# Patient Record
Sex: FEMALE | Race: WHITE | NOT HISPANIC OR LATINO | Employment: FULL TIME | URBAN - METROPOLITAN AREA
[De-identification: names, ages, dates, MRNs, and addresses within clinical notes are randomized per-mention and may not be internally consistent; named-entity substitution may affect disease eponyms.]

---

## 2019-12-27 ENCOUNTER — HOSPITAL ENCOUNTER (EMERGENCY)
Facility: CLINIC | Age: 24
Discharge: HOME | End: 2019-12-27
Attending: EMERGENCY MEDICINE | Admitting: EMERGENCY MEDICINE
Payer: COMMERCIAL

## 2019-12-27 VITALS
TEMPERATURE: 98.3 F | DIASTOLIC BLOOD PRESSURE: 69 MMHG | WEIGHT: 136 LBS | RESPIRATION RATE: 16 BRPM | OXYGEN SATURATION: 98 % | BODY MASS INDEX: 20.61 KG/M2 | SYSTOLIC BLOOD PRESSURE: 107 MMHG | HEIGHT: 68 IN | HEART RATE: 74 BPM

## 2019-12-27 DIAGNOSIS — Z91.018 FOOD ALLERGY: ICD-10-CM

## 2019-12-27 PROCEDURE — 25000131 ZZH RX MED GY IP 250 OP 636 PS 637: Performed by: EMERGENCY MEDICINE

## 2019-12-27 PROCEDURE — 25000128 H RX IP 250 OP 636: Performed by: EMERGENCY MEDICINE

## 2019-12-27 PROCEDURE — 25000132 ZZH RX MED GY IP 250 OP 250 PS 637: Performed by: EMERGENCY MEDICINE

## 2019-12-27 PROCEDURE — 99283 EMERGENCY DEPT VISIT LOW MDM: CPT

## 2019-12-27 RX ORDER — EPINEPHRINE 0.3 MG/.3ML
0.3 INJECTION SUBCUTANEOUS
Qty: 1 EACH | Refills: 0 | Status: SHIPPED | OUTPATIENT
Start: 2019-12-27

## 2019-12-27 RX ORDER — FAMOTIDINE 20 MG/1
40 TABLET, FILM COATED ORAL ONCE
Status: COMPLETED | OUTPATIENT
Start: 2019-12-27 | End: 2019-12-27

## 2019-12-27 RX ORDER — DEXAMETHASONE 4 MG/1
12 TABLET ORAL ONCE
Status: COMPLETED | OUTPATIENT
Start: 2019-12-27 | End: 2019-12-27

## 2019-12-27 RX ORDER — ONDANSETRON 4 MG/1
4 TABLET, ORALLY DISINTEGRATING ORAL ONCE
Status: COMPLETED | OUTPATIENT
Start: 2019-12-27 | End: 2019-12-27

## 2019-12-27 RX ADMIN — DEXAMETHASONE 12 MG: 4 TABLET ORAL at 21:26

## 2019-12-27 RX ADMIN — ONDANSETRON 4 MG: 4 TABLET, ORALLY DISINTEGRATING ORAL at 21:31

## 2019-12-27 RX ADMIN — FAMOTIDINE 40 MG: 20 TABLET, FILM COATED ORAL at 21:27

## 2019-12-27 ASSESSMENT — MIFFLIN-ST. JEOR: SCORE: 1420.39

## 2019-12-27 ASSESSMENT — ENCOUNTER SYMPTOMS
FACIAL SWELLING: 1
NAUSEA: 1

## 2019-12-27 NOTE — ED AVS SNAPSHOT
Emergency Department  [address] [place] [id number]  Phone:  [phone]  Fax:  [phone]                                    Deborah Borden   MRN: [id number]    Department:   Emergency Department   Date of Visit:  12/27/2019           After Visit Summary Signature Page    I have received my discharge instructions, and my questions have been answered. I have discussed any challenges I see with this plan with the nurse or doctor.    ..........................................................................................................................................  Patient/Patient Representative Signature      ..........................................................................................................................................  Patient Representative Print Name and Relationship to Patient    ..................................................               ................................................  Date                                   Time    ..........................................................................................................................................  Reviewed by Signature/Title    ...................................................              ..............................................  Date                                               Time          22EPIC Rev 08/18

## 2019-12-28 NOTE — ED TRIAGE NOTES
Patient here with an allergic reaction to sunflower seeds.she c/o hives ,bilateral ears closing.denies throat tightness

## 2019-12-28 NOTE — ED PROVIDER NOTES
"  History     Chief Complaint:  Allergic Reaction       HPI   Deborah Borden is a 24 year old female who presents with allergic reaction. The patient states that she is allergic to sunflower seeds and accidentally ingested sunflower seeds at 1830 today. The patient states that her ears started swelling and closing up. She took 2 tablets of benadryl at 1835. The patient reports having associated nausea and shaking of her extremities. The patient also had facial swelling, but the facial swelling has since resolved. She reports having a rash and itching around her neck. She denies any hospitalizations in the past from allergic reactions. The patient was concerned about her throat swelling and decided to come into the emergency room for evaluation.     Allergies:  Sunflower seeds   Dogs   Mustard     Medications:    Ortho-cept     Past Medical History:    History reviewed. No pertinent past medical history.    Past Surgical History:    History reviewed. No pertinent surgical history.     Family History:    History reviewed. No pertinent family history.     Social History:  The patient was accompanied to the ED by father.  Smoking Status: Never Smoker  Smokeless Tobacco: Never Used    Review of Systems   HENT: Positive for facial swelling.         Bilateral ear swelling   Gastrointestinal: Positive for nausea.   Skin: Positive for rash (neck ).   All other systems reviewed and are negative.      Physical Exam     Patient Vitals for the past 24 hrs:   BP Temp Temp src Heart Rate Resp SpO2 Height Weight   12/27/19 2041 129/82 98.3  F (36.8  C) Oral 81 16 100 % 1.727 m (5' 8\") 61.7 kg (136 lb)        Physical Exam    Eye:  Pupils are equal, round, and reactive.  Extraocular movements intact.    ENT:  No rhinorrhea.  Moist mucus membranes.  Normal tongue and tonsil.    Cardiac:  Regular rate and rhythm.  No murmurs, gallops, or rubs.    Pulmonary:  Clear to auscultation bilaterally.  No wheezes, rales, or " rhonchi.    Abdomen:  Positive bowel sounds.  Abdomen is soft and non-distended, without focal tenderness.    Musculoskeletal:  Normal movement of all extremities without evidence for deficit.    Skin:  Warm and dry. Small scattered urticaria over the upper chest.     Neurologic:  Non-focal exam without asymmetric weakness or numbness.     Psychiatric:  Normal affect with appropriate interaction with examiner.     Emergency Department Course     Interventions:  2126 Decadron 12 mg oral   2127 pepcid 40 mg oral   2131 zofran 4 mg oral     Emergency Department Course:  Past medical records, nursing notes, and vitals reviewed.    2045 I performed an exam of the patient as documented above.       2155 Patient rechecked and updated.       Findings and plan explained to the Patient. Patient discharged home with instructions regarding supportive care, medications, and reasons to return. The importance of close follow-up was reviewed. The patient was prescribed epipen.         Impression & Plan     Medical Decision Making:  Deborah Borden is a 24 year old female who presents to the emergency department today with concerns for an allergic reaction.  The patient has a known food allergy to sunflower seeds.  These were baked into bread that she [place] while at the airport.  She knew immediately, starting to experience typical symptoms of itching, fullness in her ears, and nausea with abdominal cramping.  She immediately took 2 Benadryl.  She attempted to get on her flight, but as her symptoms continued to intensify, she became concerned that she would have issues while on the flight and instead elected to come to the emergency department.    On my assessment, the patient shows a few hives on her neck.  Otherwise, her exam is unremarkable.  She shows no evidence of anaphylaxis.  I do not believe she requires epinephrine.  She was given a steroid along with famotidine.  She was observed over period of an hour during which time  she had complete total resolution of all symptoms.  At this juncture, I feel she is safe for discharge home.  She was advised to fill the prescription for an EpiPen to have in case something like this happens again.  We discussed the low likelihood of rebound anaphylaxis with a food allergy, though she knows to return to us if she develops any return of her symptoms or other emergent concerns.      Discharge Diagnosis:    ICD-10-CM    1. Food allergy Z91.018        Disposition:  The patient is discharged to home.    Discharge Medications:  New Prescriptions    EPINEPHRINE (EPIPEN/ADRENACLICK/OR ANY BX GENERIC EQUIV) 0.3 MG/0.3ML INJECTION 2-PACK    Inject 0.3 mLs (0.3 mg) into the muscle once as needed for anaphylaxis       Scribe Disclosure:  I, Lazaro Pereira, am serving as a scribe at 8:45 PM on 12/27/2019 to document services personally performed by Trierweiler, Chad A, MD based on my observations and the provider's statements to me.      12/27/2019   Trierweiler, Chad A, MD Trierweiler, Chad A, MD  12/27/19 6134

## 2023-02-21 NOTE — PROGRESS NOTES
SUBJECTIVE   Deborah Borden is a 27 year old G0 who presents for consultation for irregular bleeding.    She had Nexplanon placed about 2 years ago. Prior to that had irregular periods, usually monthly but diffficult to predict the day. She also had significant cramps with these. She was briefly on OCPs however switched to Nexplanon 2 years ago. Since that time she usually only has a period about every 4 months. She had a bleeding episode in , then not until January. On the  she had 1 week heavy bleeding changing tampons 3-4x/day with some blood clots. Then had bleeding again on  that was heavy, and lighter bleeding on  that has since stopped. She was having some nausea with bleeding, and migraines without aura that are unusual for her.     She also notes some vaginal discharge that is watery and increased in amount recently. Denies dysuria or constipation.       Gynecologic History  LMP: Patient's last menstrual period was 2023 (exact date).  Menses: See above  Contraception: Nexplanon inserted 2021  Prior hormonal treatment: OCPs, Nexplanon  Sexually active: Yes with male partners only, currently with one partner her boyfriend  Hx STI: Denies  Last pap smear: 3 years ago, normal result  Completed HPV vaccine    Obstetric History  OB History    Para Term  AB Living   0 0 0 0 0 0   SAB IAB Ectopic Multiple Live Births   0 0 0 0 0        Past Medical History  Past Medical History:   Diagnosis Date     Migraine without aura and without status migrainosus, not intractable        Past Surgical History  History reviewed. No pertinent surgical history.    Medications  Current Outpatient Medications   Medication     etonogestrel (NEXPLANON) 68 MG IMPL     medroxyPROGESTERone (PROVERA) 10 MG tablet     metroNIDAZOLE (METROGEL) 0.75 % vaginal gel     EPINEPHrine (EPIPEN/ADRENACLICK/OR ANY BX GENERIC EQUIV) 0.3 MG/0.3ML injection 2-pack     No current facility-administered medications  "for this visit.       Allergies     Allergies   Allergen Reactions     Sesame Oil Anaphylaxis       Social History Works as  for [employer] in [employer]  Social History     Tobacco Use     Smoking status: Never     Smokeless tobacco: Never   Vaping Use     Vaping Use: Never used   Substance Use Topics     Alcohol use: Yes     Comment: 5 a months     Drug use: Never       Family History  Family History   Problem Relation Age of Onset     Breast Cancer Paternal Aunt 55     Breast Cancer Paternal Aunt      Diabetes No family hx of      Hyperlipidemia No family hx of      Genetic Disorder No family hx of      Depression No family hx of      Coronary Artery Disease No family hx of        Review of Systems  Negative unless specified above    OBJECTIVE   /75   Pulse 78   Ht 1.73 m (5' 8.11\")   Wt 58.3 kg (128 lb 9.6 oz)   LMP 2023 (Exact Date)   BMI 19.49 kg/m    BMI: Body mass index is 19.49 kg/m .  General:  Alert, no distress   Breast:  Deferred    Lungs:  Normal work of breathing   Heart:  Regular rate   Abdomen:  Soft, non-distended   Pelvic: Normal external genitalia, normal vaginal mucosa and cervix. No significant vaginal discharge. Pap smear and STI swabs and wet prep obtained.    Extremities:  Normal     Labs:  Wet prep: pH 3.6, + WBC, no clue cells    Imaging:   None    ASSESSMENT   Deborah Borden is a 27 year old  here today for consultation for abnormal uterine bleeding. Ddx likely secondary to Nexplanon and overgrowth of endometrial lining without bleeding episode since , now s/p heavy bleeding. Unlikely structural etiology given age or secondary AUB given current Nexplanon in place.     Given bleeding is now resolving, will continue expectant management. If persistent, consider withdrawal bleed given possible anovulatory bleeding on Nexplanon with 7d Provera. RTC in 3 months to evaluate bleeding. Discussed this could be persistent side effect from Nexplanon, if so can " consider addition of hormonal medication for regulation of bleeding or removal and alternative birth control options. Can also consider additional AUB work up at that time with US, CBC, TSH, prolactin as needed.       PLAN     #AUB  - Likely secondary to Nexplanon  - Provera for withdrawal bleed sent to pharmacy, to be started if she has additional irregular bleeding   - RTC 2-3 months, if continued irregular bleeding discuss additional/alternative options to Nexplanon and additional AUB work up    #Vaginal discharge   - Symptomatic vaginal discharge despite negative exam, wet prep  - If not resolving in next 1-2 days, will treat empirically for BV  - Metrogel sent to pharmacy     #Pap screening  - Last in 2020 normal  - Pap done today    #STI screening  - GCCT swab today    Results via MyChart  RTC 2-3 months to follow up bleeding  Staffed with Dr. Slade Peck MD PGY4  Obstetrics & Gynecology  02/21/23     The patient was seen in resident continuity clinic by Dr. Peck.  I have reviewed the history and exam, the assessment and plan were jointly made.     Ava June MD, FACOG

## 2023-02-23 ENCOUNTER — OFFICE VISIT (OUTPATIENT)
Dept: OBGYN | Facility: CLINIC | Age: 28
End: 2023-02-23
Attending: STUDENT IN AN ORGANIZED HEALTH CARE EDUCATION/TRAINING PROGRAM
Payer: COMMERCIAL

## 2023-02-23 VITALS
HEART RATE: 78 BPM | DIASTOLIC BLOOD PRESSURE: 75 MMHG | WEIGHT: 128.6 LBS | HEIGHT: 68 IN | BODY MASS INDEX: 19.49 KG/M2 | SYSTOLIC BLOOD PRESSURE: 112 MMHG

## 2023-02-23 DIAGNOSIS — Z12.4 SCREENING FOR CERVICAL CANCER: ICD-10-CM

## 2023-02-23 DIAGNOSIS — N92.1 BREAKTHROUGH BLEEDING ON NEXPLANON: Primary | ICD-10-CM

## 2023-02-23 DIAGNOSIS — N76.0 BACTERIAL VAGINOSIS: ICD-10-CM

## 2023-02-23 DIAGNOSIS — B96.89 BACTERIAL VAGINOSIS: ICD-10-CM

## 2023-02-23 DIAGNOSIS — Z11.3 SCREEN FOR STD (SEXUALLY TRANSMITTED DISEASE): ICD-10-CM

## 2023-02-23 DIAGNOSIS — Z97.5 BREAKTHROUGH BLEEDING ON NEXPLANON: Primary | ICD-10-CM

## 2023-02-23 LAB
CLUE CELLS: NEGATIVE
TRICHOMONAS (WET PREP): NEGATIVE
WBC (WET PREP): NORMAL
YEAST (WET PREP): NEGATIVE

## 2023-02-23 PROCEDURE — 87210 SMEAR WET MOUNT SALINE/INK: CPT | Performed by: STUDENT IN AN ORGANIZED HEALTH CARE EDUCATION/TRAINING PROGRAM

## 2023-02-23 PROCEDURE — 99203 OFFICE O/P NEW LOW 30 MIN: CPT | Mod: GE | Performed by: OBSTETRICS & GYNECOLOGY

## 2023-02-23 PROCEDURE — 87591 N.GONORRHOEAE DNA AMP PROB: CPT | Performed by: STUDENT IN AN ORGANIZED HEALTH CARE EDUCATION/TRAINING PROGRAM

## 2023-02-23 PROCEDURE — G0463 HOSPITAL OUTPT CLINIC VISIT: HCPCS | Performed by: STUDENT IN AN ORGANIZED HEALTH CARE EDUCATION/TRAINING PROGRAM

## 2023-02-23 PROCEDURE — 87491 CHLMYD TRACH DNA AMP PROBE: CPT | Performed by: STUDENT IN AN ORGANIZED HEALTH CARE EDUCATION/TRAINING PROGRAM

## 2023-02-23 PROCEDURE — G0145 SCR C/V CYTO,THINLAYER,RESCR: HCPCS | Performed by: STUDENT IN AN ORGANIZED HEALTH CARE EDUCATION/TRAINING PROGRAM

## 2023-02-23 RX ORDER — METRONIDAZOLE 7.5 MG/G
1 GEL VAGINAL DAILY
Qty: 35 G | Refills: 0 | Status: SHIPPED | OUTPATIENT
Start: 2023-02-23 | End: 2023-03-02

## 2023-02-23 RX ORDER — MEDROXYPROGESTERONE ACETATE 10 MG
10 TABLET ORAL DAILY
Qty: 7 TABLET | Refills: 0 | Status: SHIPPED | OUTPATIENT
Start: 2023-02-23 | End: 2023-03-02

## 2023-02-23 NOTE — NURSING NOTE
Has nexplanon since 2020  Since this jan 21 has had two periods a month that lat one week. And cramping.  Last pap smear 3 years ago  wnl

## 2023-02-23 NOTE — LETTER
Date:February 24, 2023      Provider requested that no letter be sent. Do not send.       [facility]

## 2023-02-23 NOTE — LETTER
2023       RE: Deborah Borden  [address] [id number]     Dear Colleague,    Thank you for referring your patient, Deborah Borden, to the [facility] [place] at [facility]. Please see a copy of my visit note below.    SUBJECTIVE   Deborah Borden is a 27 year old G0 who presents for consultation for irregular bleeding.    She had Nexplanon placed about 2 years ago. Prior to that had irregular periods, usually monthly but diffficult to predict the day. She also had significant cramps with these. She was briefly on OCPs however switched to Nexplanon 2 years ago. Since that time she usually only has a period about every 4 months. She had a bleeding episode in , then not until January. On the  she had 1 week heavy bleeding changing tampons 3-4x/day with some blood clots. Then had bleeding again on  that was heavy, and lighter bleeding on  that has since stopped. She was having some nausea with bleeding, and migraines without aura that are unusual for her.     She also notes some vaginal discharge that is watery and increased in amount recently. Denies dysuria or constipation.       Gynecologic History  LMP: Patient's last menstrual period was 2023 (exact date).  Menses: See above  Contraception: Nexplanon inserted 2021  Prior hormonal treatment: OCPs, Nexplanon  Sexually active: Yes with male partners only, currently with one partner her boyfriend  Hx STI: Denies  Last pap smear: 3 years ago, normal result  Completed HPV vaccine    Obstetric History  OB History    Para Term  AB Living   0 0 0 0 0 0   SAB IAB Ectopic Multiple Live Births   0 0 0 0 0        Past Medical History  Past Medical History:   Diagnosis Date     Migraine without aura and without status migrainosus, not intractable        Past Surgical History  History reviewed. No pertinent surgical history.    Medications  Current  "Outpatient Medications   Medication     etonogestrel (NEXPLANON) 68 MG IMPL     medroxyPROGESTERone (PROVERA) 10 MG tablet     metroNIDAZOLE (METROGEL) 0.75 % vaginal gel     EPINEPHrine (EPIPEN/ADRENACLICK/OR ANY BX GENERIC EQUIV) 0.3 MG/0.3ML injection 2-pack     No current facility-administered medications for this visit.       Allergies     Allergies   Allergen Reactions     Sesame Oil Anaphylaxis       Social History Works as  for [employer] in [employer]  Social History     Tobacco Use     Smoking status: Never     Smokeless tobacco: Never   Vaping Use     Vaping Use: Never used   Substance Use Topics     Alcohol use: Yes     Comment: 5 a months     Drug use: Never       Family History  Family History   Problem Relation Age of Onset     Breast Cancer Paternal Aunt 55     Breast Cancer Paternal Aunt      Diabetes No family hx of      Hyperlipidemia No family hx of      Genetic Disorder No family hx of      Depression No family hx of      Coronary Artery Disease No family hx of        Review of Systems  Negative unless specified above    OBJECTIVE   /75   Pulse 78   Ht 1.73 m (5' 8.11\")   Wt 58.3 kg (128 lb 9.6 oz)   LMP 2023 (Exact Date)   BMI 19.49 kg/m    BMI: Body mass index is 19.49 kg/m .  General:  Alert, no distress   Breast:  Deferred    Lungs:  Normal work of breathing   Heart:  Regular rate   Abdomen:  Soft, non-distended   Pelvic: Normal external genitalia, normal vaginal mucosa and cervix. No significant vaginal discharge. Pap smear and STI swabs and wet prep obtained.    Extremities:  Normal     Labs:  Wet prep: pH 3.6, + WBC, no clue cells    Imaging:   None    ASSESSMENT   Deborah Borden is a 27 year old  here today for consultation for abnormal uterine bleeding. Ddx likely secondary to Nexplanon and overgrowth of endometrial lining without bleeding episode since , now s/p heavy bleeding. Unlikely structural etiology given age or secondary AUB given current " Nexplanon in place.     Given bleeding is now resolving, will continue expectant management. If persistent, consider withdrawal bleed given possible anovulatory bleeding on Nexplanon with 7d Provera. RTC in 3 months to evaluate bleeding. Discussed this could be persistent side effect from Nexplanon, if so can consider addition of hormonal medication for regulation of bleeding or removal and alternative birth control options. Can also consider additional AUB work up at that time with US, CBC, TSH, prolactin as needed.       PLAN     #AUB  - Likely secondary to Nexplanon  - Provera for withdrawal bleed sent to pharmacy, to be started if she has additional irregular bleeding   - RTC 2-3 months, if continued irregular bleeding discuss additional/alternative options to Nexplanon and additional AUB work up    #Vaginal discharge   - Symptomatic vaginal discharge despite negative exam, wet prep  - If not resolving in next 1-2 days, will treat empirically for BV  - Metrogel sent to pharmacy     #Pap screening  - Last in 2020 normal  - Pap done today    #STI screening  - GCCT swab today    Results via MyChart  RTC 2-3 months to follow up bleeding  Staffed with Dr. Slade Peck MD PGY4  Obstetrics & Gynecology  02/21/23     The patient was seen in resident continuity clinic by Dr. Peck.  I have reviewed the history and exam, the assessment and plan were jointly made.     Ava June MD, FACOG            Again, thank you for allowing me to participate in the care of your patient.      Sincerely,    Sheryl Peck MD

## 2023-02-24 ENCOUNTER — LAB (OUTPATIENT)
Dept: LAB | Facility: CLINIC | Age: 28
End: 2023-02-24
Payer: COMMERCIAL

## 2023-02-24 DIAGNOSIS — Z11.3 SCREEN FOR STD (SEXUALLY TRANSMITTED DISEASE): Primary | ICD-10-CM

## 2023-02-24 LAB
C TRACH DNA SPEC QL PROBE+SIG AMP: NEGATIVE
N GONORRHOEA DNA SPEC QL NAA+PROBE: NEGATIVE

## 2023-02-28 LAB
BKR LAB AP GYN ADEQUACY: NORMAL
BKR LAB AP GYN INTERPRETATION: NORMAL
BKR LAB AP HPV REFLEX: NORMAL
BKR LAB AP PREVIOUS ABNORMAL: NORMAL
PATH REPORT.COMMENTS IMP SPEC: NORMAL
PATH REPORT.COMMENTS IMP SPEC: NORMAL
PATH REPORT.RELEVANT HX SPEC: NORMAL

## 2023-04-09 ENCOUNTER — HEALTH MAINTENANCE LETTER (OUTPATIENT)
Age: 28
End: 2023-04-09

## 2023-04-23 NOTE — PROGRESS NOTES
[facility]  Gynecology Visit    HPI:  Deborah Borden is a 28 year old , here for follow up of AUB. Was seen in clinic . Discussed that irregular bleeding is likely secondary to Nexplanon. Unlikely structural etiology given age. Considered withdrawal bleed given possible anovulatory bleeding on Nexplanon with 7d Provera.     Today, reports bleeding has been persistently every other week. Most recent bleed 1.5 weeks ago. Bleeding for 4-5 days, using 2-3 pads/tampons per day. Has not yet tried Provera. Also, very nauseous last week/feeling off with intense mood swings.  Wondering if this could be related to systemic hormones. Finally, having vaginal discharge and pain with intercourse that she thinks is from a yeast infection.     GYN History  LMP: Patient's last menstrual period was 1.5 weeks ago  Menses: See above  Contraception: Nexplanon inserted 2021  Prior hormonal treatment: OCPs, Nexplanon  Sexually active: Yes with male partners only, currently with one partner her boyfriend  Hx STI: Denies  Last pap smear: 2023, NILM  Completed HPV vaccine     OBHx  OB History    Para Term  AB Living   0 0 0 0 0 0   SAB IAB Ectopic Multiple Live Births   0 0 0 0 0     PMHx: Migraine without aura   PSHx: None  Meds: Nexplanon    ROS: 10-Point ROS negative except as noted in HPI    Physical Exam  LMP 2023 (Exact Date)   Gen: Well-appearing, NAD  HEENT: Normocephalic, atraumatic  CV:  Regular rate, well perfused  Pulm: Breathing comfortably on room air     Assessment/Plan:  Deborah Borden is a 28 year old  female here for breakthrough bleeding on Nexplanon. Discussed possible etiologies again including structural causes (PALM) and iatrogenic 2/2 Nexplanon. Management options reviewed including adding OCPs to try to control irregular bleeding or switching from Nexplanon to alternative option such as OCP, patch, ring, depo, Mirena IUD. Recommended IUD due to most favorable  bleeding pattern. Discussed that mood swings could be secondary to systemic progesterone. Another benefit of the IUD therefore would be decreased systemic hormones. She is not interested in OCPs, patch or ring as she had a bad experience previously with estrogen containing birth control.   - Considering Mirena IUD; is going to research and MyChart to schedule placement if this is what she desires   - Fluconazole 150 mg PO x 1 sent to Pharmacy for yeast; discussed that if dyspareunia continues may be due hypoestrogenic effect of Nexplanon- could then consider topical estrogen     Staffed with Dr. Slade Noguera MD  Ob/Gyn PGY-1  04/24/23 11:51 AM    The patient was seen and evaluated with Dr. Noguera.  I have reviewed and edited the above note.     Ava June MD, FACOG

## 2023-04-24 ENCOUNTER — OFFICE VISIT (OUTPATIENT)
Dept: OBGYN | Facility: CLINIC | Age: 28
End: 2023-04-24
Attending: OBSTETRICS & GYNECOLOGY
Payer: COMMERCIAL

## 2023-04-24 VITALS
DIASTOLIC BLOOD PRESSURE: 72 MMHG | WEIGHT: 132.7 LBS | HEIGHT: 68 IN | SYSTOLIC BLOOD PRESSURE: 105 MMHG | BODY MASS INDEX: 20.11 KG/M2 | HEART RATE: 74 BPM

## 2023-04-24 DIAGNOSIS — Z97.5 BREAKTHROUGH BLEEDING ON NEXPLANON: Primary | ICD-10-CM

## 2023-04-24 DIAGNOSIS — N92.1 BREAKTHROUGH BLEEDING ON NEXPLANON: Primary | ICD-10-CM

## 2023-04-24 DIAGNOSIS — B37.31 YEAST INFECTION OF THE VAGINA: ICD-10-CM

## 2023-04-24 PROCEDURE — G0463 HOSPITAL OUTPT CLINIC VISIT: HCPCS | Performed by: OBSTETRICS & GYNECOLOGY

## 2023-04-24 PROCEDURE — 99214 OFFICE O/P EST MOD 30 MIN: CPT | Mod: GC | Performed by: OBSTETRICS & GYNECOLOGY

## 2023-04-24 RX ORDER — FLUCONAZOLE 150 MG/1
150 TABLET ORAL ONCE
Qty: 1 TABLET | Refills: 0 | Status: SHIPPED | OUTPATIENT
Start: 2023-04-24 | End: 2023-04-24

## 2023-04-24 NOTE — LETTER
2023       RE: Deborah Borden  [address]  [facility] [address]     Dear Colleague,    Thank you for referring your patient, Deborah Borden, to the [facility] [place] at [facility]. Please see a copy of my visit note below.    [facility]  Gynecology Visit    HPI:  Deborah Borden is a 28 year old , here for follow up of AUB. Was seen in clinic . Discussed that irregular bleeding is likely secondary to Nexplanon. Unlikely structural etiology given age. Considered withdrawal bleed given possible anovulatory bleeding on Nexplanon with 7d Provera.     Today, reports bleeding has been persistently every other week. Most recent bleed 1.5 weeks ago. Bleeding for 4-5 days, using 2-3 pads/tampons per day. Has not yet tried Provera. Also, very nauseous last week/feeling off with intense mood swings.  Wondering if this could be related to systemic hormones. Finally, having vaginal discharge and pain with intercourse that she thinks is from a yeast infection.     GYN History  LMP: Patient's last menstrual period was 1.5 weeks ago  Menses: See above  Contraception: Nexplanon inserted 2021  Prior hormonal treatment: OCPs, Nexplanon  Sexually active: Yes with male partners only, currently with one partner her boyfriend  Hx STI: Denies  Last pap smear: 2023, NILM  Completed HPV vaccine     OBHx  OB History    Para Term  AB Living   0 0 0 0 0 0   SAB IAB Ectopic Multiple Live Births   0 0 0 0 0     PMHx: Migraine without aura   PSHx: None  Meds: Nexplanon    ROS: 10-Point ROS negative except as noted in HPI    Physical Exam  LMP 2023 (Exact Date)   Gen: Well-appearing, NAD  HEENT: Normocephalic, atraumatic  CV:  Regular rate, well perfused  Pulm: Breathing comfortably on room air     Assessment/Plan:  Deborah Borden is a 28 year old  female here for breakthrough bleeding on Nexplanon. Discussed  possible etiologies again including structural causes (PALM) and iatrogenic 2/2 Nexplanon. Management options reviewed including adding OCPs to try to control irregular bleeding or switching from Nexplanon to alternative option such as OCP, patch, ring, depo, Mirena IUD. Recommended IUD due to most favorable bleeding pattern. Discussed that mood swings could be secondary to systemic progesterone. Another benefit of the IUD therefore would be decreased systemic hormones. She is not interested in OCPs, patch or ring as she had a bad experience previously with estrogen containing birth control.   - Considering Mirena IUD; is going to research and MyChart to schedule placement if this is what she desires   - Fluconazole 150 mg PO x 1 sent to Pharmacy for yeast; discussed that if dyspareunia continues may be due hypoestrogenic effect of Nexplanon- could then consider topical estrogen     Staffed with Dr. Slade Noguera MD  Ob/Gyn PGY-1  04/24/23 11:51 AM    The patient was seen and evaluated with Dr. Noguera.  I have reviewed and edited the above note.     Ava June MD, FACOG

## 2023-04-24 NOTE — PATIENT INSTRUCTIONS
Thank you for trusting us with your care!     If you need to contact us for questions about:  Symptoms, Scheduling & Medical Questions; Non-urgent (2-3 day response) Sumit message, Urgent (needing response today) [phone] (if after 3:30pm next day response)   Prescriptions: Please call your Pharmacy   Billing: [place] [phone] or [facility]:[phone]

## 2023-05-15 PROBLEM — A08.4 VIRAL GASTROENTERITIS: Status: ACTIVE | Noted: 2019-11-19

## 2023-05-15 PROBLEM — Z97.5 NEXPLANON IN PLACE: Status: ACTIVE | Noted: 2021-01-20

## 2023-05-16 ENCOUNTER — OFFICE VISIT (OUTPATIENT)
Dept: MIDWIFE SERVICES | Facility: CLINIC | Age: 28
End: 2023-05-16
Payer: COMMERCIAL

## 2023-05-16 VITALS
OXYGEN SATURATION: 99 % | WEIGHT: 127 LBS | DIASTOLIC BLOOD PRESSURE: 66 MMHG | HEART RATE: 79 BPM | BODY MASS INDEX: 19.25 KG/M2 | SYSTOLIC BLOOD PRESSURE: 111 MMHG

## 2023-05-16 DIAGNOSIS — Z30.46 ENCOUNTER FOR NEXPLANON REMOVAL: Primary | ICD-10-CM

## 2023-05-16 PROCEDURE — 11982 REMOVE DRUG IMPLANT DEVICE: CPT | Performed by: ADVANCED PRACTICE MIDWIFE

## 2023-05-16 NOTE — PROGRESS NOTES
Nexplanon Removal:     Is a pregnancy test required: No.  Was a consent obtained?  Yes    Deborah Borden is here for removal of etonogestrel implant Nexplanon/Implanon    Indication: Patient request      Preoperative Diagnosis: etonogestrel implant  Postoperative Diagnosis: etonogestrel implant removed    Technique: On the left arm  Skin prep Betadine  Anesthesia 1% lidocaine  Procedure: Small incision (<5mm) was made at distal end of palpable implant, curved hemostat or mosquito forceps was used to isolate the implant and bring it to the incision, the fibrous capsule containing the implant  was incised and the Implant was removed intact.    EBL: minimal  Complications:  No  Tolerance:  Pt tolerated procedure well and was in stable condition.   Dressing:    A pressure bandage was placed for the next 12-24 hours.    Contraception was discussed and patient chose the following method condoms      Follow up: Pt was instructed to call if bleeding, severe pain or foul smell.     Yanira LEONARD      I have reviewed and verified the documentation as completed by the Nurse Midwife Student, of the procedure which I personally performed.  Damon Bro CNM  May 16, 2023

## 2023-11-03 ENCOUNTER — VIRTUAL VISIT (OUTPATIENT)
Dept: FAMILY MEDICINE | Facility: CLINIC | Age: 28
End: 2023-11-03
Payer: COMMERCIAL

## 2023-11-03 DIAGNOSIS — R53.83 OTHER FATIGUE: Primary | ICD-10-CM

## 2023-11-03 PROCEDURE — 99213 OFFICE O/P EST LOW 20 MIN: CPT | Mod: VID

## 2023-11-03 NOTE — PROGRESS NOTES
Deborah is a 28 year old who is being evaluated via a billable video visit.      How would you like to obtain your AVS? MyChart  If the video visit is dropped, the invitation should be resent by: Text to cell phone: [phone]  Will anyone else be joining your video visit? No          Assessment & Plan     Other fatigue  - Mononucleosis screen; Future  Differentials include mono, bronchitis, eustachian tube dysfunction/allergies, pneumonia  Persistent fatigue, ear pressure, sore throat. Cough has improved over time, making pneumonia less likely. Louise decision making on monotest. Symptoms could relate to recovery from URI or possibly exhaustion from resolving bronchitis. Suggested sudafed in AM for decongestant and cough suppressant at night.  If symptoms are persistent beyond 2 weeks and mono is negative, suggest in-person follow up for physical assessment.        Rafael Garcia NP  [facility]    Mckayla Cabrera is a 28 year old, presenting for the following health issues:  Fatigue (/)  Feeling better today    On 1st/2nd of October, she had 102F fever  This whole month, she has had no energy. Full body fatigue. Cough (deep, mucousy, yellowish tinge), sensation of ear pressure and ears hurting. Sore throat hurting on and off. Fatigue and body aches and sore throat waking up in the morning. Ear pain when waking up. Maybe tension headache when tired.  COVID negative.  Cough has improved. Fever (last known was beginning of October).  No significant nasal drainage.  Lung capacity less- winded more easily.    She went to [facility] and was thought to have bronchitis and given prescription for albuterol/cough suppressant, which did help for symptoms.    Partner who is not sick.    HPI       Review of Systems   Constitutional, HEENT, cardiovascular, pulmonary, gi and gu systems are negative, except as otherwise noted.      Objective           Vitals:  No vitals were obtained today due to  virtual visit.    Physical Exam   GENERAL: Healthy, alert and no distress  EYES: Eyes grossly normal to inspection.  No discharge or erythema, or obvious scleral/conjunctival abnormalities.  RESP: No audible wheeze, cough, or visible cyanosis.  No visible retractions or increased work of breathing.    SKIN: Visible skin clear. No significant rash, abnormal pigmentation or lesions.  NEURO: Cranial nerves grossly intact.  Mentation and speech appropriate for age.  PSYCH: Mentation appears normal, affect normal/bright, judgement and insight intact, normal speech and appearance well-groomed.          Video-Visit Details  Joined the call at 11/3/2023, 1:23:14 pm.  Left the call at 11/3/2023, 1:35:05 pm.  Type of service:  Video Visit     Originating Location (pt. Location): Home    Distant Location (provider location):  On-site  Platform used for Video Visit: Breadcrumbtracking

## 2023-11-24 ENCOUNTER — LAB (OUTPATIENT)
Dept: LAB | Facility: CLINIC | Age: 28
End: 2023-11-24
Payer: COMMERCIAL

## 2023-11-24 DIAGNOSIS — R53.83 OTHER FATIGUE: ICD-10-CM

## 2023-11-24 LAB — MONOCYTES NFR BLD AUTO: NEGATIVE %

## 2023-11-24 PROCEDURE — 99000 SPECIMEN HANDLING OFFICE-LAB: CPT | Performed by: PATHOLOGY

## 2023-11-24 PROCEDURE — 36415 COLL VENOUS BLD VENIPUNCTURE: CPT | Performed by: PATHOLOGY

## 2023-11-24 PROCEDURE — 86308 HETEROPHILE ANTIBODY SCREEN: CPT

## 2023-11-27 ENCOUNTER — OFFICE VISIT (OUTPATIENT)
Dept: INTERNAL MEDICINE | Facility: CLINIC | Age: 28
End: 2023-11-27
Payer: COMMERCIAL

## 2023-11-27 ENCOUNTER — ANCILLARY PROCEDURE (OUTPATIENT)
Dept: GENERAL RADIOLOGY | Facility: CLINIC | Age: 28
End: 2023-11-27
Attending: NURSE PRACTITIONER
Payer: COMMERCIAL

## 2023-11-27 VITALS
BODY MASS INDEX: 19.7 KG/M2 | SYSTOLIC BLOOD PRESSURE: 110 MMHG | DIASTOLIC BLOOD PRESSURE: 68 MMHG | HEART RATE: 86 BPM | RESPIRATION RATE: 16 BRPM | OXYGEN SATURATION: 99 % | HEIGHT: 68 IN | TEMPERATURE: 98.4 F | WEIGHT: 130 LBS

## 2023-11-27 DIAGNOSIS — R05.9 COUGH, UNSPECIFIED TYPE: Primary | ICD-10-CM

## 2023-11-27 DIAGNOSIS — R05.9 COUGH, UNSPECIFIED TYPE: ICD-10-CM

## 2023-11-27 PROCEDURE — 99214 OFFICE O/P EST MOD 30 MIN: CPT | Performed by: NURSE PRACTITIONER

## 2023-11-27 PROCEDURE — 71046 X-RAY EXAM CHEST 2 VIEWS: CPT | Mod: TC | Performed by: RADIOLOGY

## 2023-11-27 RX ORDER — PREDNISONE 20 MG/1
20 TABLET ORAL DAILY
Qty: 5 TABLET | Refills: 0 | Status: SHIPPED | OUTPATIENT
Start: 2023-11-27 | End: 2023-12-02

## 2023-11-27 ASSESSMENT — ENCOUNTER SYMPTOMS: COUGH: 1

## 2023-11-27 NOTE — PATIENT INSTRUCTIONS
Chest x-ray today to look for pneumonia.    I think the more likely scenario is that you have some persistent inflammation in your lungs.  We will try to treat that with prednisone 40 mg daily for 5 days.    Take the prednisone first thing in the morning with food.  Do not mix with ibuprofen or Aleve.    Okay to use Tylenol.    If you are still experiencing symptoms after 1 week, you can send me a [employer] message and we could try Singulair 10 mg daily for 1 month.

## 2023-11-27 NOTE — PROGRESS NOTES
Assessment & Plan     Cough, unspecified type  Chest x-ray today to look for pneumonia.  My suspicion is she may have a mild lingering asthma.  We will treat with prednisone.  See patient instructions below for plan of care    - XR Chest 2 Views; Future  - predniSONE (DELTASONE) 20 MG tablet; Take 1 tablet (20 mg) by mouth daily for 5 days    Patient Instructions   Chest x-ray today to look for pneumonia.    I think the more likely scenario is that you have some persistent inflammation in your lungs.  We will try to treat that with prednisone 40 mg daily for 5 days.    Take the prednisone first thing in the morning with food.  Do not mix with ibuprofen or Aleve.    Okay to use Tylenol.    If you are still experiencing symptoms after 1 week, you can send me a [employer] message and we could try Singulair 10 mg daily for 1 month.    [facility]    Mckayla Cabrera is a 28 year old, presenting for the following health issues:    Cough (2+ months  cough with fatigue, can't do much activity)      11/27/2023     9:11 AM   Additional Questions   Roomed by Kaycee Clark    History of Present Illness       Reason for visit:  Lingeribg lung issues and fatigue    She eats 2-3 servings of fruits and vegetables daily.She consumes 0 sweetened beverage(s) daily.She exercises with enough effort to increase her heart rate 20 to 29 minutes per day.  She exercises with enough effort to increase her heart rate 3 or less days per week.   She is taking medications regularly.     Almost 2 months ago, she started developing a cough after experiencing an acute onset of upper respiratory symptoms.  Since that time, she has had a mild lingering cough with significant inflammation in her chest as well as persistent fatigue.    Childhood history of asthma.  Intermittent wheezing now.  Does not smoke.      Review of Systems   Respiratory:  Positive for cough.       Constitutional,  "HEENT, cardiovascular, pulmonary, gi and gu systems are negative, except as otherwise noted.      Objective    /68 (BP Location: Right arm, Patient Position: Sitting, Cuff Size: Adult Regular)   Pulse 86   Temp 98.4  F (36.9  C)   Resp 16   Ht 1.727 m (5' 8\")   Wt 59 kg (130 lb)   SpO2 99%   BMI 19.77 kg/m    Body mass index is 19.77 kg/m .  Physical Exam   GENERAL: healthy, alert and no distress  NECK: no adenopathy, no asymmetry, masses, or scars and thyroid normal to palpation  RESP: lungs clear to auscultation - no rales, rhonchi or wheezes  CV: regular rate and rhythm, normal S1 S2, no S3 or S4, no murmur, click or rub, no peripheral edema and peripheral pulses strong  ABDOMEN: soft, nontender, no hepatosplenomegaly, no masses and bowel sounds normal  MS: no gross musculoskeletal defects noted, no edema                  "

## 2023-12-06 ENCOUNTER — MYC MEDICAL ADVICE (OUTPATIENT)
Dept: INTERNAL MEDICINE | Facility: CLINIC | Age: 28
End: 2023-12-06
Payer: COMMERCIAL

## 2023-12-06 DIAGNOSIS — R05.9 COUGH, UNSPECIFIED TYPE: Primary | ICD-10-CM

## 2023-12-07 RX ORDER — MONTELUKAST SODIUM 10 MG/1
10 TABLET ORAL AT BEDTIME
Qty: 30 TABLET | Refills: 0 | Status: SHIPPED | OUTPATIENT
Start: 2023-12-07 | End: 2024-03-04

## 2024-02-01 ENCOUNTER — VIRTUAL VISIT (OUTPATIENT)
Dept: FAMILY MEDICINE | Facility: CLINIC | Age: 29
End: 2024-02-01
Payer: COMMERCIAL

## 2024-02-01 DIAGNOSIS — R06.02 SHORTNESS OF BREATH: Primary | ICD-10-CM

## 2024-02-01 DIAGNOSIS — Z87.09 HISTORY OF FREQUENT URI: ICD-10-CM

## 2024-02-01 PROCEDURE — 99213 OFFICE O/P EST LOW 20 MIN: CPT | Mod: 95

## 2024-02-01 NOTE — PROGRESS NOTES
"Deborah is a 28 year old who is being evaluated via a billable video visit.      How would you like to obtain your AVS? MyChart  If the video visit is dropped, the invitation should be resent by: Text to cell phone: [phone]  Will anyone else be joining your video visit? No          Assessment & Plan     Shortness of breath  - mometasone furoate (ASMANEX HFA) 100 MCG/ACT inhaler; Inhale 2 puffs into the lungs 2 times daily    History of frequent URI  - CBC with platelets and differential; Future    History of frequent infections (every 2 weeks since October), and sensation of shortness of breath that was improved with steroids (prednisone). Albuterol makes no change in symptoms. We could have patient try a steroid inhaler to see if symptoms improved. Advised her to rinse out mouth to prevent thrush  Will do CBC to r/o immunocompromised state  Unclear etiology: possible long COVID, asthma, restrictive lung etiology. Follow-up is planned with asthma/allergy specialist in March.    Subjective   Deborah is a 28 year old, presenting for the following health issues:  No chief complaint on file.  Patient had a cough that improved, and feels a sense of chronic lung inflammation.  She feels like she has some shortness of breath, and \"can't breathe\" all the way. Improves on the steroids- prednisone. Symptoms happen a few times per week. Same symptoms seem to occur. This has been occurring since October (when having bronchitis). Fever was only in October.  Patient never took Singulair due to being concerned about her symptoms.      Sometimes feels very fatigued on the couch all day. Albuterol inhaler does not seem to be working.  Monday, she was contemplating going to urgent care.    HPI           Review of Systems  Constitutional, HEENT, cardiovascular, pulmonary, gi and gu systems are negative, except as otherwise noted.      Objective           Vitals:  No vitals were obtained today due to virtual visit.    Physical Exam "   GENERAL: alert and no distress  EYES: Eyes grossly normal to inspection.  No discharge or erythema, or obvious scleral/conjunctival abnormalities.  RESP: No audible wheeze, cough, or visible cyanosis.    SKIN: Visible skin clear. No significant rash, abnormal pigmentation or lesions.  NEURO: Cranial nerves grossly intact.  Mentation and speech appropriate for age.  PSYCH: Appropriate affect, tone, and pace of words          Video-Visit Details  Joined the call at 2/1/2024, 5:03:46 pm.  Left the call at 2/1/2024, 5:16:52 pm.  Type of service:  Video Visit     Originating Location (pt. Location): Home    Distant Location (provider location):  On-site  Platform used for Video Visit: Jon  Signed Electronically by: Rafael Garcia NP

## 2024-02-07 ENCOUNTER — LAB (OUTPATIENT)
Dept: LAB | Facility: CLINIC | Age: 29
End: 2024-02-07
Payer: COMMERCIAL

## 2024-02-07 DIAGNOSIS — Z87.09 HISTORY OF FREQUENT URI: ICD-10-CM

## 2024-02-07 LAB
BASOPHILS # BLD AUTO: 0 10E3/UL (ref 0–0.2)
BASOPHILS NFR BLD AUTO: 1 %
EOSINOPHIL # BLD AUTO: 0.3 10E3/UL (ref 0–0.7)
EOSINOPHIL NFR BLD AUTO: 6 %
ERYTHROCYTE [DISTWIDTH] IN BLOOD BY AUTOMATED COUNT: 13 % (ref 10–15)
HCT VFR BLD AUTO: 39.2 % (ref 35–47)
HGB BLD-MCNC: 12.5 G/DL (ref 11.7–15.7)
IMM GRANULOCYTES # BLD: 0 10E3/UL
IMM GRANULOCYTES NFR BLD: 0 %
LYMPHOCYTES # BLD AUTO: 2.3 10E3/UL (ref 0.8–5.3)
LYMPHOCYTES NFR BLD AUTO: 48 %
MCH RBC QN AUTO: 26.8 PG (ref 26.5–33)
MCHC RBC AUTO-ENTMCNC: 31.9 G/DL (ref 31.5–36.5)
MCV RBC AUTO: 84 FL (ref 78–100)
MONOCYTES # BLD AUTO: 0.4 10E3/UL (ref 0–1.3)
MONOCYTES NFR BLD AUTO: 8 %
NEUTROPHILS # BLD AUTO: 1.8 10E3/UL (ref 1.6–8.3)
NEUTROPHILS NFR BLD AUTO: 37 %
PLATELET # BLD AUTO: 230 10E3/UL (ref 150–450)
RBC # BLD AUTO: 4.66 10E6/UL (ref 3.8–5.2)
WBC # BLD AUTO: 4.9 10E3/UL (ref 4–11)

## 2024-02-07 PROCEDURE — 85025 COMPLETE CBC W/AUTO DIFF WBC: CPT

## 2024-02-07 PROCEDURE — 36415 COLL VENOUS BLD VENIPUNCTURE: CPT

## 2024-03-04 ENCOUNTER — OFFICE VISIT (OUTPATIENT)
Dept: ALLERGY | Facility: CLINIC | Age: 29
End: 2024-03-04
Payer: COMMERCIAL

## 2024-03-04 VITALS — HEIGHT: 68 IN | HEART RATE: 68 BPM | BODY MASS INDEX: 19.71 KG/M2 | OXYGEN SATURATION: 99 %

## 2024-03-04 DIAGNOSIS — R05.9 COUGH, UNSPECIFIED TYPE: Primary | ICD-10-CM

## 2024-03-04 LAB
FEF 25/75: NORMAL
FEV-1: NORMAL
FEV1/FVC: NORMAL
FVC: NORMAL

## 2024-03-04 PROCEDURE — 94010 BREATHING CAPACITY TEST: CPT | Performed by: ALLERGY & IMMUNOLOGY

## 2024-03-04 PROCEDURE — 99203 OFFICE O/P NEW LOW 30 MIN: CPT | Mod: 25 | Performed by: ALLERGY & IMMUNOLOGY

## 2024-03-04 RX ORDER — ALBUTEROL SULFATE 90 UG/1
2 AEROSOL, METERED RESPIRATORY (INHALATION) EVERY 4 HOURS PRN
COMMUNITY
Start: 2024-03-04

## 2024-03-04 NOTE — LETTER
3/4/2024         RE: Deborah Borden  [address] [facility] [address]        Dear Colleague,    Thank you for referring your patient, Deborah Borden, to the [facility]. Please see a copy of my visit note below.    Deborah Borden was seen in the Allergy Clinic at [facility].    Deborah Borden is a 29 year old White female being seen today in consultation for a cough.    Feels she has lingering inflammation in her lungs. Triggered by exercise - feels very tired the following day after exercising and she has some difficulty breathing. Feels she can't take full deep breaths in. This all started when she had bronchitis in October. Has had 2 courses of prednisone and the medication helped while she was taking it but symptoms returned when the course was completed. Although she is not fully back to her usual baseline she feels her symptoms have significantly improved and will continue to improve with time.    Had asthma as a child that had resolved. She did not have any recurrent symptoms until this recent illness..    Component      Latest Ref Rng 2/7/2024  8:49 AM   WBC      4.0 - 11.0 10e3/uL 4.9    RBC Count      3.80 - 5.20 10e6/uL 4.66    Hemoglobin      11.7 - 15.7 g/dL 12.5    Hematocrit      35.0 - 47.0 % 39.2    MCV      78 - 100 fL 84    MCH      26.5 - 33.0 pg 26.8    MCHC      31.5 - 36.5 g/dL 31.9    RDW      10.0 - 15.0 % 13.0    Platelet Count      150 - 450 10e3/uL 230    % Neutrophils      % 37    % Lymphocytes      % 48    % Monocytes      % 8    % Eosinophils      % 6    % Basophils      % 1    % Immature Granulocytes      % 0    Absolute Neutrophils      1.6 - 8.3 10e3/uL 1.8    Absolute Lymphocytes      0.8 - 5.3 10e3/uL 2.3    Absolute Monocytes      0.0 - 1.3 10e3/uL 0.4    Absolute Eosinophils      0.0 - 0.7 10e3/uL 0.3    Absolute Basophils      0.0 - 0.2 10e3/uL 0.0    Absolute Immature Granulocytes      <=0.4 10e3/uL 0.0          Past  Medical History:   Diagnosis Date     Migraine without aura and without status migrainosus, not intractable      Family History   Problem Relation Age of Onset     Breast Cancer Paternal Aunt 55     Breast Cancer Paternal Aunt      Diabetes No family hx of      Hyperlipidemia No family hx of      Genetic Disorder No family hx of      Depression No family hx of      Coronary Artery Disease No family hx of      History reviewed. No pertinent surgical history.    ENVIRONMENTAL HISTORY:   Debor[place] The home is heated with a radiant heat. They does not have central air conditioning. The patient's bedroom is furnished with stuffed animals in bed, carpeting in bedroom, allergen mattress cover, and allergen pillowcase cover.  Pets inside the house include 1 cat(s). There is no history of cockroach or mice infestation. Do you smoke cigarettes or other recreational drugs? No Do you vape or use an e-cigarette? No. There is/are 0 smokers living in the house. There is/are 1 who smoke ecigarettes/vape living in the house. The house does not have a damp basement.     SOCIAL HISTORY:   Deborah is employed in [employer] work. She lives alone.        Current Outpatient Medications:      albuterol (PROAIR HFA/PROVENTIL HFA/VENTOLIN HFA) 108 (90 Base) MCG/ACT inhaler, Inhale 2 puffs into the lungs every 4 hours as needed, Disp: , Rfl:      EPINEPHrine (EPIPEN/ADRENACLICK/OR ANY BX GENERIC EQUIV) 0.3 MG/0.3ML injection 2-pack, Inject 0.3 mLs (0.3 mg) into the muscle once as needed for anaphylaxis (Patient not taking: Reported on 3/4/2024), Disp: 1 each, Rfl: 0     mometasone furoate (ASMANEX HFA) 100 MCG/ACT inhaler, Inhale 2 puffs into the lungs 2 times daily (Patient not taking: Reported on 3/4/2024), Disp: 13 g, Rfl: 1  Immunization History   Administered Date(s) Administered     COVID-19 12+ (2023-24) (Pfizer) 01/10/2024     COVID-19 Monovalent 18+ (Moderna) 03/08/2021, 04/05/2021, 12/11/2021      "DT (PEDS <7y) 07/25/2000     DTAP (<7y) 1996, 1996, 1996, 09/30/1997     Flu, Unspecified 12/11/2006     HEPATITIS A (PEDS 12M-18Y) 08/24/2007, 08/04/2008     HepB, Unspecified 1996, 1996, 01/14/1997     Hib, Unspecified 1996, 1996, 1996, 06/12/1997     Influenza (H1N1) 12/16/2009     Influenza (IIV3) PF 11/23/1999, 12/23/1999, 12/12/2001, 11/21/2002, 12/10/2003, 11/11/2004, 11/16/2005, 11/14/2007     Influenza Vaccine (Flucelvax Quadrivalent) 10/25/2021, 10/06/2022     Influenza Vaccine >6 months,quad, PF 12/26/2019     Influenza, seasonal, injectable, PF 12/11/2006, 10/28/2008, 12/16/2009, 11/04/2010, 11/29/2012     Influenza,INJ,MDCK,PF,Quad >6mo(Flucelvax) 11/08/2018     MMR 06/12/1997, 07/25/2000     Meningococcal ACWY (Menactra ) 08/04/2008, 01/22/2014     Nasal Influenza Vaccine 2-49 (FluMist) 10/29/2013     Polio, Unspecified  1996, 1996, 1996     Poliovirus, inactivated (IPV) 07/25/2000     Td (Adult), Adsorbed 08/24/2007     Varicella 08/24/2007     Allergies   Allergen Reactions     Sesame Oil Anaphylaxis     Sunflower Oil Anaphylaxis     Mustard Oil [Allyl Isothiocyanate] Hives         EXAM:   Pulse 68   Ht 1.73 m (5' 8.1\")   SpO2 99%   BMI 19.71 kg/m    Physical Exam  Vitals and nursing note reviewed.   Constitutional:       Appearance: Normal appearance.   HENT:      Head: Normocephalic and atraumatic.      Right Ear: External ear normal.      Left Ear: External ear normal.      Nose: No mucosal edema, congestion or rhinorrhea.      Mouth/Throat:      Mouth: Mucous membranes are moist. No oral lesions.      Pharynx: Oropharynx is clear. Uvula midline. No posterior oropharyngeal erythema.   Eyes:      General: Lids are normal. No scleral icterus.     Extraocular Movements: Extraocular movements intact.      Conjunctiva/sclera: Conjunctivae normal.   Neck:      Comments: No asymmetry, masses, or scars  Cardiovascular:      Rate and " Rhythm: Normal rate and regular rhythm.      Heart sounds: S1 normal and S2 normal. No murmur heard.  Pulmonary:      Effort: Pulmonary effort is normal. No respiratory distress.      Breath sounds: Normal breath sounds and air entry.   Musculoskeletal:      Comments: No musculoskeletal defects noted   Skin:     General: Skin is warm and dry.      Findings: No lesion or rash.   Neurological:      General: No focal deficit present.      Mental Status: She is alert.   Psychiatric:         Mood and Affect: Mood and affect normal.           WORKUP: Spirometry    SPIROMETRY       FVC 4.12L (96% of predicted).     FEV1 3.32L (92% of predicted).     FEV1/FVC 81%      I have reviewed and interpreted these results. Testing meets criteria for acceptability and reproducibility. Values are consistent with normal lung function.    ASSESSMENT/PLAN:  Deborah Borden is a 29 year old female here for evaluation of a cough.    1. Cough, unspecified type - Became ill last October and was diagnosed with bronchitis. She has been prescribed 2 courses of prednisone in the last few months. Although not fully recovered she feels there has been significant improvement in her symptoms and anticipated ongoing improvement with time. Spirometry obtained today is normal. She declined to pursue any further evaluation at this time.    - Spirometry, Breathing Capacity: Normal Order, Clinic Performed      Follow-up as needed      Thank you for allowing me to participate in the care of Deborah Borden.      Naveen Chairez MD, FAAAAI  Allergy/Immunology  [facility]      Chart documentation done in part with Dragon Voice Recognition Software. Although reviewed after completion, some word and grammatical errors may remain.      Again, thank you for allowing me to participate in the care of your patient.        Sincerely,        Naveen Chairez MD

## 2024-03-04 NOTE — PROGRESS NOTES
Deborah Borden was seen in the Allergy Clinic at [facility].    Deborah Borden is a 29 year old White female being seen today in consultation for a cough.    Feels she has lingering inflammation in her lungs. Triggered by exercise - feels very tired the following day after exercising and she has some difficulty breathing. Feels she can't take full deep breaths in. This all started when she had bronchitis in October. Has had 2 courses of prednisone and the medication helped while she was taking it but symptoms returned when the course was completed. Although she is not fully back to her usual baseline she feels her symptoms have significantly improved and will continue to improve with time.    Had asthma as a child that had resolved. She did not have any recurrent symptoms until this recent illness..    Component      Latest Ref Rng 2/7/2024  8:49 AM   WBC      4.0 - 11.0 10e3/uL 4.9    RBC Count      3.80 - 5.20 10e6/uL 4.66    Hemoglobin      11.7 - 15.7 g/dL 12.5    Hematocrit      35.0 - 47.0 % 39.2    MCV      78 - 100 fL 84    MCH      26.5 - 33.0 pg 26.8    MCHC      31.5 - 36.5 g/dL 31.9    RDW      10.0 - 15.0 % 13.0    Platelet Count      150 - 450 10e3/uL 230    % Neutrophils      % 37    % Lymphocytes      % 48    % Monocytes      % 8    % Eosinophils      % 6    % Basophils      % 1    % Immature Granulocytes      % 0    Absolute Neutrophils      1.6 - 8.3 10e3/uL 1.8    Absolute Lymphocytes      0.8 - 5.3 10e3/uL 2.3    Absolute Monocytes      0.0 - 1.3 10e3/uL 0.4    Absolute Eosinophils      0.0 - 0.7 10e3/uL 0.3    Absolute Basophils      0.0 - 0.2 10e3/uL 0.0    Absolute Immature Granulocytes      <=0.4 10e3/uL 0.0          Past Medical History:   Diagnosis Date    Migraine without aura and without status migrainosus, not intractable      Family History   Problem Relation Age of Onset    Breast Cancer Paternal Aunt 55    Breast Cancer Paternal Aunt     Diabetes No family  hx of     Hyperlipidemia No family hx of     Genetic Disorder No family hx of     Depression No family hx of     Coronary Artery Disease No family hx of      History reviewed. No pertinent surgical history.    ENVIRONMENTAL HISTORY:   Debor[place] The home is heated with a radiant heat. They does not have central air conditioning. The patient's bedroom is furnished with stuffed animals in bed, carpeting in bedroom, allergen mattress cover, and allergen pillowcase cover.  Pets inside the house include 1 cat(s). There is no history of cockroach or mice infestation. Do you smoke cigarettes or other recreational drugs? No Do you vape or use an e-cigarette? No. There is/are 0 smokers living in the house. There is/are 1 who smoke ecigarettes/vape living in the house. The house does not have a damp basement.     SOCIAL HISTORY:   Deborah is employed in [employer] work. She lives alone.        Current Outpatient Medications:     albuterol (PROAIR HFA/PROVENTIL HFA/VENTOLIN HFA) 108 (90 Base) MCG/ACT inhaler, Inhale 2 puffs into the lungs every 4 hours as needed, Disp: , Rfl:     EPINEPHrine (EPIPEN/ADRENACLICK/OR ANY BX GENERIC EQUIV) 0.3 MG/0.3ML injection 2-pack, Inject 0.3 mLs (0.3 mg) into the muscle once as needed for anaphylaxis (Patient not taking: Reported on 3/4/2024), Disp: 1 each, Rfl: 0    mometasone furoate (ASMANEX HFA) 100 MCG/ACT inhaler, Inhale 2 puffs into the lungs 2 times daily (Patient not taking: Reported on 3/4/2024), Disp: 13 g, Rfl: 1  Immunization History   Administered Date(s) Administered    COVID-19 12+ (2023-24) (Pfizer) 01/10/2024    COVID-19 Monovalent 18+ (Moderna) 03/08/2021, 04/05/2021, 12/11/2021    DT (PEDS <7y) 07/25/2000    DTAP (<7y) 1996, 1996, 1996, 09/30/1997    Flu, Unspecified 12/11/2006    HEPATITIS A (PEDS 12M-18Y) 08/24/2007, 08/04/2008    HepB, Unspecified 1996, 1996, 01/14/1997    Hib, Unspecified 1996,  "1996, 1996, 06/12/1997    Influenza (H1N1) 12/16/2009    Influenza (IIV3) PF 11/23/1999, 12/23/1999, 12/12/2001, 11/21/2002, 12/10/2003, 11/11/2004, 11/16/2005, 11/14/2007    Influenza Vaccine (Flucelvax Quadrivalent) 10/25/2021, 10/06/2022    Influenza Vaccine >6 months,quad, PF 12/26/2019    Influenza, seasonal, injectable, PF 12/11/2006, 10/28/2008, 12/16/2009, 11/04/2010, 11/29/2012    Influenza,INJ,MDCK,PF,Quad >6mo(Flucelvax) 11/08/2018    MMR 06/12/1997, 07/25/2000    Meningococcal ACWY (Menactra ) 08/04/2008, 01/22/2014    Nasal Influenza Vaccine 2-49 (FluMist) 10/29/2013    Polio, Unspecified  1996, 1996, 1996    Poliovirus, inactivated (IPV) 07/25/2000    Td (Adult), Adsorbed 08/24/2007    Varicella 08/24/2007     Allergies   Allergen Reactions    Sesame Oil Anaphylaxis    Sunflower Oil Anaphylaxis    Mustard Oil [Allyl Isothiocyanate] Hives         EXAM:   Pulse 68   Ht 1.73 m (5' 8.1\")   SpO2 99%   BMI 19.71 kg/m    Physical Exam  Vitals and nursing note reviewed.   Constitutional:       Appearance: Normal appearance.   HENT:      Head: Normocephalic and atraumatic.      Right Ear: External ear normal.      Left Ear: External ear normal.      Nose: No mucosal edema, congestion or rhinorrhea.      Mouth/Throat:      Mouth: Mucous membranes are moist. No oral lesions.      Pharynx: Oropharynx is clear. Uvula midline. No posterior oropharyngeal erythema.   Eyes:      General: Lids are normal. No scleral icterus.     Extraocular Movements: Extraocular movements intact.      Conjunctiva/sclera: Conjunctivae normal.   Neck:      Comments: No asymmetry, masses, or scars  Cardiovascular:      Rate and Rhythm: Normal rate and regular rhythm.      Heart sounds: S1 normal and S2 normal. No murmur heard.  Pulmonary:      Effort: Pulmonary effort is normal. No respiratory distress.      Breath sounds: Normal breath sounds and air entry.   Musculoskeletal:      Comments: No " musculoskeletal defects noted   Skin:     General: Skin is warm and dry.      Findings: No lesion or rash.   Neurological:      General: No focal deficit present.      Mental Status: She is alert.   Psychiatric:         Mood and Affect: Mood and affect normal.           WORKUP: Spirometry    SPIROMETRY       FVC 4.12L (96% of predicted).     FEV1 3.32L (92% of predicted).     FEV1/FVC 81%      I have reviewed and interpreted these results. Testing meets criteria for acceptability and reproducibility. Values are consistent with normal lung function.    ASSESSMENT/PLAN:  Deborah Borden is a 29 year old female here for evaluation of a cough.    1. Cough, unspecified type - Became ill last October and was diagnosed with bronchitis. She has been prescribed 2 courses of prednisone in the last few months. Although not fully recovered she feels there has been significant improvement in her symptoms and anticipated ongoing improvement with time. Spirometry obtained today is normal. She declined to pursue any further evaluation at this time.    - Spirometry, Breathing Capacity: Normal Order, Clinic Performed      Follow-up as needed      Thank you for allowing me to participate in the care of Deborah Borden.      Naveen Chairez MD, FAAAAI  Allergy/Immunology  [facility]      Chart documentation done in part with Dragon Voice Recognition Software. Although reviewed after completion, some word and grammatical errors may remain.

## 2024-03-29 ENCOUNTER — MYC REFILL (OUTPATIENT)
Dept: FAMILY MEDICINE | Facility: CLINIC | Age: 29
End: 2024-03-29
Payer: COMMERCIAL

## 2024-03-29 DIAGNOSIS — R06.02 SHORTNESS OF BREATH: ICD-10-CM

## 2024-06-15 ENCOUNTER — HEALTH MAINTENANCE LETTER (OUTPATIENT)
Age: 29
End: 2024-06-15

## 2025-07-06 ENCOUNTER — HEALTH MAINTENANCE LETTER (OUTPATIENT)
Age: 30
End: 2025-07-06